# Patient Record
Sex: FEMALE | Race: BLACK OR AFRICAN AMERICAN | NOT HISPANIC OR LATINO | Employment: FULL TIME | URBAN - METROPOLITAN AREA
[De-identification: names, ages, dates, MRNs, and addresses within clinical notes are randomized per-mention and may not be internally consistent; named-entity substitution may affect disease eponyms.]

---

## 2018-06-18 LAB — ABORH_EXT (HISTORICAL CONVERSION): NORMAL

## 2018-06-25 LAB
ANTIBODY_EXT (HISTORICAL CONVERSION): NORMAL
HBSAG_EXT (HISTORICAL CONVERSION): NORMAL
HGB_EXT (HISTORICAL CONVERSION): 12.4
HIV_EXT: NORMAL
PLT_EXT - HISTORICAL: 166
RPR - HISTORICAL: NORMAL

## 2018-09-15 ENCOUNTER — HOSPITAL ENCOUNTER (OUTPATIENT)
Dept: OBGYN | Facility: HOSPITAL | Age: 33
Discharge: HOME | End: 2018-09-15
Attending: FAMILY MEDICINE | Admitting: FAMILY MEDICINE

## 2018-09-15 LAB
ALBUMIN UR-MCNC: NEGATIVE MG/DL
APPEARANCE UR: ABNORMAL
BACTERIA #/AREA URNS HPF: ABNORMAL HPF
BILIRUB UR QL STRIP: NEGATIVE
COLOR UR AUTO: YELLOW
ERYTHROCYTE [DISTWIDTH] IN BLOOD BY AUTOMATED COUNT: 13.7 % (ref 11–14.5)
GLUCOSE UR STRIP-MCNC: NEGATIVE MG/DL
HCT VFR BLD AUTO: 31.5 % (ref 35–47)
HGB BLD-MCNC: 10.9 G/DL (ref 12–16)
HGB UR QL STRIP: NEGATIVE
KETONES UR STRIP-MCNC: ABNORMAL MG/DL
LEUKOCYTE ESTERASE UR QL STRIP: NEGATIVE
MCH RBC QN AUTO: 31.9 PG (ref 27–34)
MCHC RBC AUTO-ENTMCNC: 34.6 G/DL (ref 32–36)
MCV RBC AUTO: 92 FL (ref 80–100)
MUCOUS THREADS #/AREA URNS LPF: ABNORMAL LPF
NITRATE UR QL: NEGATIVE
PH UR STRIP: 6 [PH] (ref 4.5–8)
PLATELET # BLD AUTO: 128 THOU/UL (ref 140–440)
PMV BLD AUTO: 11.9 FL (ref 8.5–12.5)
RBC # BLD AUTO: 3.42 MILL/UL (ref 3.8–5.4)
RBC #/AREA URNS AUTO: ABNORMAL HPF
SP GR UR STRIP: 1.02 (ref 1–1.03)
SQUAMOUS #/AREA URNS AUTO: ABNORMAL LPF
UROBILINOGEN UR STRIP-ACNC: ABNORMAL
WBC #/AREA URNS AUTO: ABNORMAL HPF
WBC: 11.4 THOU/UL (ref 4–11)

## 2018-09-15 ASSESSMENT — MIFFLIN-ST. JEOR: SCORE: 1447.51

## 2018-09-16 ENCOUNTER — COMMUNICATION - HEALTHEAST (OUTPATIENT)
Dept: OBGYN | Facility: HOSPITAL | Age: 33
End: 2018-09-16

## 2018-10-12 ENCOUNTER — ALLIED HEALTH/NURSE VISIT (OUTPATIENT)
Dept: NURSING | Facility: CLINIC | Age: 33
End: 2018-10-12

## 2018-10-12 VITALS — SYSTOLIC BLOOD PRESSURE: 112 MMHG | DIASTOLIC BLOOD PRESSURE: 72 MMHG | OXYGEN SATURATION: 98 % | HEART RATE: 104 BPM

## 2018-10-12 DIAGNOSIS — R42 DIZZINESS: Primary | ICD-10-CM

## 2018-10-12 PROCEDURE — 99207 ZZC NO CHARGE NURSE ONLY: CPT

## 2018-10-12 NOTE — MR AVS SNAPSHOT
After Visit Summary   10/12/2018    Cecil Anaya    MRN: [id number]           Patient Information     Date Of Birth          1985        Visit Information        Provider Department      10/12/2018 9:00 AM HP RN/TRIAGE NURSE ONLY [facility]        Today's Diagnoses     Dizziness    -  1       Follow-ups after your visit        Who to contact     If you have questions or need follow up information about today's clinic visit or your schedule please contact [facility] directly at [phone].  Normal or non-critical lab and imaging results will be communicated to you by MyChart, letter or phone within 4 business days after the clinic has received the results. If you do not hear from us within 7 days, please contact the clinic through MyChart or phone. If you have a critical or abnormal lab result, we will notify you by phone as soon as possible.  Submit refill requests through [employer] or call your pharmacy and they will forward the refill request to us. Please allow 3 business days for your refill to be completed.          Additional Information About Your Visit        Care EveryWhere ID     This is your Care EveryWhere ID. This could be used by other organizations to access your [place] medical records  [id number]        Your Vitals Were     Pulse Pulse Oximetry                104 98%           Blood Pressure from Last 3 Encounters:   10/12/18 112/72    Weight from Last 3 Encounters:   No data found for Wt              Today, you had the following     No orders found for display       Primary Care Provider    Provider Not In System                Equal Access to Services     VAZQUEZ OLIVA : Hadii hood bedoya Sooli, waaxda luqadaha, qaybta kaalmada brendan manriquez. So [facility] [phone].    ATENCIÓN: Si habla español, tiene a cuadra disposición servicios gratuitos de asistencia lingüística. Llame al [phone].    We  comply with applicable federal civil rights laws and [place] laws. We do not discriminate on the basis of race, color, national origin, age, disability, sex, sexual orientation, or gender identity.            Thank you!     Thank you for choosing [facility]  for your care. Our goal is always to provide you with excellent care. Hearing back from our patients is one way we can continue to improve our services. Please take a few minutes to complete the written survey that you may receive in the mail after your visit with us. Thank you!             Your Updated Medication List - Protect others around you: Learn how to safely use, store and throw away your medicines at www.disposemymeds.org.      Notice  As of 10/12/2018 11:20 AM    You have not been prescribed any medications.

## 2018-10-12 NOTE — NURSING NOTE
Triaged this walkin patient who states she had a dizzy lightheaded feeling while at work today and felt like she was going to faint and felt weak.  Work staff took her BP and it was 87/50.  She is driven here by her work supervisor. It was close to her work.  No records on this patient.    She is going to [facility] in [facility].[phone].  States she was just seen there on Tuesday and everything was ok.    Nurse did an assessment.  She has a better BP reading now. States she had breakfast and is drinking water.  Nurse called the triage nurse at [facility] and they have directed her to go to the [facility] and the supervisor will drive her there now.    She leaves ambulatory.  No diaphoresis. States she feels a little better.    She agrees with the plan.  Qiana Patel RN

## 2020-01-07 ENCOUNTER — HOSPITAL ENCOUNTER (OUTPATIENT)
Dept: OBGYN | Facility: HOSPITAL | Age: 35
Discharge: HOME | End: 2020-01-07
Attending: FAMILY MEDICINE | Admitting: FAMILY MEDICINE

## 2020-01-07 DIAGNOSIS — N76.0 BV (BACTERIAL VAGINOSIS): ICD-10-CM

## 2020-01-07 DIAGNOSIS — O47.9 BRAXTON HICKS CONTRACTIONS: ICD-10-CM

## 2020-01-07 DIAGNOSIS — B96.89 BV (BACTERIAL VAGINOSIS): ICD-10-CM

## 2020-01-07 LAB
ALBUMIN UR-MCNC: ABNORMAL MG/DL
AMORPH CRY #/AREA URNS HPF: ABNORMAL /[HPF]
APPEARANCE UR: ABNORMAL
BACTERIA #/AREA URNS HPF: ABNORMAL HPF
BILIRUB UR QL STRIP: NEGATIVE
CLUE CELLS: ABNORMAL
COLOR UR AUTO: YELLOW
GLUCOSE UR STRIP-MCNC: NEGATIVE MG/DL
HGB UR QL STRIP: NEGATIVE
HYALINE CASTS #/AREA URNS LPF: ABNORMAL LPF
KETONES UR STRIP-MCNC: NEGATIVE MG/DL
LEUKOCYTE ESTERASE UR QL STRIP: NEGATIVE
MUCOUS THREADS #/AREA URNS LPF: ABNORMAL LPF
NITRATE UR QL: NEGATIVE
PH UR STRIP: 7.5 [PH] (ref 4.5–8)
RBC #/AREA URNS AUTO: ABNORMAL HPF
SP GR UR STRIP: 1.02 (ref 1–1.03)
SQUAMOUS #/AREA URNS AUTO: ABNORMAL LPF
TRICHOMONAS, WET PREP: ABNORMAL
UROBILINOGEN UR STRIP-ACNC: ABNORMAL
WBC #/AREA URNS AUTO: ABNORMAL HPF
YEAST, WET PREP: ABNORMAL

## 2020-01-07 RX ORDER — HYDROXYZINE PAMOATE 50 MG/1
50 CAPSULE ORAL 3 TIMES DAILY PRN
Qty: 30 CAPSULE | Refills: 0 | Status: SHIPPED | OUTPATIENT
Start: 2020-01-07

## 2020-01-07 ASSESSMENT — MIFFLIN-ST. JEOR: SCORE: 1456.58

## 2020-01-08 ENCOUNTER — TELEPHONE (OUTPATIENT)
Dept: FAMILY MEDICINE | Facility: CLINIC | Age: 35
End: 2020-01-08

## 2020-01-08 NOTE — TELEPHONE ENCOUNTER
OB intake completed via phone with patient. Cecil is a 35 yo, , female,  with history of 2 full term deliveries, 2 premature deliveries (1- therapeutic, induced, stillbirth, vaginal delivery at 34-35 weeks gestation, 2- active labor and vaginal delivery at 35 weeks gestation).  One elective  in 2017 at 10 weeks gestation. Larry WELCH

## 2020-01-08 NOTE — TELEPHONE ENCOUNTER
----- Message from Yahir Hutchinson MD sent at 2020  8:46 PM CST -----  Pito Huang and Annetta,    I met this patient tonight in OB triage for rule out labor which we did (no labor). She currently follows with [facility] midwives but plans to deliver at [facility] and wanted to transfer care to our clinic if possible.    The best phone number to reach her is [phone] and she would like our clinic to reach out to her.    [facility]. Uncomplicated prenatal course thus far.    Just let me know if there are any questions, thanks!    ~Cam

## 2020-01-23 ENCOUNTER — HOSPITAL ENCOUNTER (OUTPATIENT)
Dept: OBGYN | Facility: HOSPITAL | Age: 35
Discharge: HOME | End: 2020-01-23
Attending: FAMILY MEDICINE | Admitting: FAMILY MEDICINE

## 2020-01-23 LAB
ALBUMIN UR-MCNC: ABNORMAL MG/DL
APPEARANCE UR: ABNORMAL
BACTERIA #/AREA URNS HPF: ABNORMAL HPF
BASOPHILS # BLD AUTO: 0 THOU/UL (ref 0–0.2)
BASOPHILS NFR BLD AUTO: 0 % (ref 0–2)
BILIRUB UR QL STRIP: NEGATIVE
COLOR UR AUTO: YELLOW
EOSINOPHIL # BLD AUTO: 0.1 THOU/UL (ref 0–0.4)
EOSINOPHIL NFR BLD AUTO: 1 % (ref 0–6)
ERYTHROCYTE [DISTWIDTH] IN BLOOD BY AUTOMATED COUNT: 15.5 % (ref 11–14.5)
GLUCOSE UR STRIP-MCNC: NEGATIVE MG/DL
HCT VFR BLD AUTO: 31.5 % (ref 35–47)
HGB BLD-MCNC: 10.4 G/DL (ref 12–16)
HGB UR QL STRIP: NEGATIVE
KETONES UR STRIP-MCNC: ABNORMAL MG/DL
LEUKOCYTE ESTERASE UR QL STRIP: ABNORMAL
LYMPHOCYTES # BLD AUTO: 2 THOU/UL (ref 0.8–4.4)
LYMPHOCYTES NFR BLD AUTO: 25 % (ref 20–40)
MCH RBC QN AUTO: 30 PG (ref 27–34)
MCHC RBC AUTO-ENTMCNC: 33 G/DL (ref 32–36)
MCV RBC AUTO: 91 FL (ref 80–100)
MONOCYTES # BLD AUTO: 0.6 THOU/UL (ref 0–0.9)
MONOCYTES NFR BLD AUTO: 7 % (ref 2–10)
MUCOUS THREADS #/AREA URNS LPF: ABNORMAL LPF
NEUTROPHILS # BLD AUTO: 5.3 THOU/UL (ref 2–7.7)
NEUTROPHILS NFR BLD AUTO: 66 % (ref 50–70)
NITRATE UR QL: NEGATIVE
PH UR STRIP: 6.5 [PH] (ref 4.5–8)
PLATELET # BLD AUTO: 103 THOU/UL (ref 140–440)
PMV BLD AUTO: 12.4 FL (ref 8.5–12.5)
RBC # BLD AUTO: 3.47 MILL/UL (ref 3.8–5.4)
RBC #/AREA URNS AUTO: ABNORMAL HPF
SP GR UR STRIP: 1.01 (ref 1–1.03)
SQUAMOUS #/AREA URNS AUTO: ABNORMAL LPF
UROBILINOGEN UR STRIP-ACNC: ABNORMAL
WBC #/AREA URNS AUTO: ABNORMAL HPF
WBC: 8.1 THOU/UL (ref 4–11)

## 2020-01-23 ASSESSMENT — MIFFLIN-ST. JEOR: SCORE: 1479.26

## 2020-01-24 ENCOUNTER — DOCUMENTATION ONLY (OUTPATIENT)
Dept: FAMILY MEDICINE | Facility: CLINIC | Age: 35
End: 2020-01-24

## 2020-01-24 NOTE — PROGRESS NOTES
HOUSE OFFICER NOTE    While I was on-call yesterday, late last night, I saw Cecil Anaya,  1985 in L&D who presented for the evaluation of lower abdominal pain.    Background:   Cecil Anaya 34 year old female,  at 32w4d (as of 2020) based on early dating ultrasound done on 2019 with ALYSA 03/15/2020. Dr. Hutchinson had also seen her in L&D on 2020, where she expressed interest to switch her prenatal care from [facility] to [place]. I also read Annetta Mercado's, RN OB intake note of 2020.    Assessment/Plan:  eCcil was found NOT to be in labor yesterday. Fetal heart sounds and tracings were reassuring. CBC and UA were unremarkable, so unlikely an infectious process causing the abdominal pain. Most likely explanation of her symptoms was ongoing stretching of the round ligament as the pregnancy progresses. I discussed the findings with the patient and she verbalized understanding. (Please refer to my [facility] Epic note for details of the encounter - MRN [id number])    -Patient is still very interested in establishing care with us.   -I  routed a message to Annetta Mercado RN and [facility] team so they can reach out to the patient to follow-up with me for MATT.    Sendy Sawyer MD, MPH (PGY 2)  [facility] Family Medicine Resident  Pager: [phone]

## 2020-01-25 LAB — BACTERIA SPEC CULT: NORMAL

## 2020-08-31 ENCOUNTER — AMBULATORY - HEALTHEAST (OUTPATIENT)
Dept: OTHER | Facility: CLINIC | Age: 35
End: 2020-08-31

## 2020-08-31 ENCOUNTER — DOCUMENTATION ONLY (OUTPATIENT)
Dept: OTHER | Facility: CLINIC | Age: 35
End: 2020-08-31

## 2021-06-02 VITALS — HEIGHT: 60 IN | WEIGHT: 181 LBS | BODY MASS INDEX: 35.53 KG/M2

## 2021-06-04 VITALS — WEIGHT: 183 LBS | BODY MASS INDEX: 35.93 KG/M2 | HEIGHT: 60 IN

## 2021-06-04 VITALS — HEIGHT: 60 IN | BODY MASS INDEX: 36.91 KG/M2 | WEIGHT: 188 LBS

## 2021-06-05 NOTE — PROGRESS NOTES
Category 1 tracing and no contractions noted on EFM. MD reviewed test results with patient and pt to DC home with prescriptions as ordered. Dc'd home after discharge instructions given and explained. Pt verbalized understanding.

## 2021-06-05 NOTE — PROGRESS NOTES
2015: Pt is G6,  at 32 4/7 wks, presents with complaints of abdominal pain since 3pm that has not gone away. States she is not sure if there are contractions. Denies ROM or bleeding and states good fetal movements. EFM/TOCO applied. UA obtained and sent.   2130: Resident MD at bedside. Blood drawn.   2230: Labs WNL. Resident again at bedside. Plan for return appt in clinic. Pt declining Tylenol or any other medications for pain. Pt has belly pregnancy band to wear at home. OK to HI home.

## 2021-06-05 NOTE — PROGRESS NOTES
"Patient arrives to the unit with complaints of abdominal pains and vaginal pressure that is intense. She denies LOF and bleeding. She denies having any vaginal discharge. She reports having all vaginal births, and one  delivery at 35weeks with her last child. During initial assessment, RN noticed that the patient appeared significantly uncomfortable, she reports that \"the baby is pushing through my vagina.\" RN does SVE and she is closed, thick, and high. Will plan to place OB arrival orders and do a wetprep and UA. Residents paged.   "

## 2021-06-05 NOTE — PROGRESS NOTES
[facility] FAMILY MEDICINE RESIDENCY PROGRESS NOTE - OB TRIAGE NOTE        Chief Complaint: Lower abdominal pain   OBSTETRICAL / DATING HISTORY:  Estimated Date of Delivery: Estimated Date of Delivery: 3/15/20  Gestational Age: 32w4d    PRENATAL HISTORY:  Obstetrician: Previously was seen at [facility]  - Clare RAE CNM; switched care to [facility] on 2020  Number of visits: > [facility] visits [facility]  Glucose test: Unknown  GBS: Known    OB History    Para Term  AB Living   6 4 2 2 1 3   SAB TAB Ectopic Multiple Live Births   1 0 0 0 3      # Outcome Date GA Lbr Osman/2nd Weight Sex Delivery Anes PTL Lv   6 Current            5  18 35w2d       ESPERANZA   4 SAB 17     SAB      3  11 33w5d       FD   2 Term 08 38w0d   M CS-LVertical   ESPERANZA      Birth Comments: emergency CS due to false positive HIV test. retest was negative   1 Term 06 38w0d   M Vag-Spont   ESPERANZA       HPI:      SUBJECTIVE  Cecil Anaya is a 34 y.o.  (1st at 37 weeks living; 2nd at 37 weeks living; 3rd still birth at 8.5 months; 4th at 35 weeks living; 5th ) at 32w4d of gestation based on early  dating ultrasound done on 2019 with ALYSA 03/15/2020who has presented to maternity care for further evaluation of lower abdominal pain.      Patient reports she was in her usual state of health until today around 1500 hrs while at work.  She started to experience lower abdominal pain, sudden onset, with unknown precipitating factors, aggravated by movement, unknown relieving factors, no radiation, intermittent in nature, 8/10, sharp in nature.  Reports normal urinary and bowel habits.  Patient denies labor-like contractions, that increase in intensity or frequency.  No recent febrile illness or contact with sick individuals. Patient reports no vaginal bleeding, no  contractions/severe cramping, no leakage of fluid. Fetal movement are present. No nausea/vomiting. No heartburn. No vaginal discharge. No dysuria. No headache, vision changes, lower extremity swelling, upper abdominal pain.       Review of Systems  Negative unless mentioned above     PAST SURGICAL HISTORY: Reviewed and none    MEDICATIONS:  Prenatal vitamins    SOCIAL HISTORY:   -Smoking: None  -Alcohol: None  -Illicit drug use: None    OBJECTIVE: PHYSICAL EXAMINATION     /63   Pulse 79   Temp 97.9  F (36.6  C) (Oral)   Resp 16   Ht 5' (1.524 m)   Wt 188 lb (85.3 kg)   BMI 36.72 kg/m     GENERAL:  Pleasant pregnant female, alert, well groomed.  SKIN:  Warm and dry, without lesions or rashes   EYES:  PERRLA, EOM intact   MOUTH:  Buccal mucosa pink, moist without lesions.    NECK:  Thyroid without enlargement and nodules.  No cervical lymphadenopathy.   LUNGS:  Clear to auscultation.   HEART:  RRR without murmur.   ABDOMEN: Soft without masses , tenderness or organomegaly.  No CVA tenderness. Fundus palpable above the umbilicus.  and 155  MUSCULOSKELETAL:  Full range of motion.   EXTREMITIES:  No edema. No significant varicosities.       OBGyn Exam  Fetal assessment:  -Heart Rate baseline classification: Baseline Range/Rate: 135  -Variability: Variability: Moderate Variability 6-25bpm    Uterine activity:  -Mode: Monitor Mode: External  -Contraction frequency: Contraction Frequency (min): None  -Resting Tone Palpated: Resting Tone Palpated: Soft    FETAL HEART MONITORING: Category 1    LAB RESULTS: Prenatal labs:   Hemoglobin   Date Value Ref Range Status   2020 10.4 (L) 12.0 - 16.0 g/dL Final       ASSESSMENT/PLAN:    Cecil Anaya is a 34 y.o.  at 32w4d of gestation based on early  dating ultrasound done on 2019 with ALYSA 03/15/2020, is NOT in labor based on monitoring and tracings.  Fetal heart sounds and tracings are reassuring.  CBC and UA are unremarkable, so unlikely an  "infectious process causing the abdominal pain.  Most likely explanation of the lower abdominal pain is ongoing stretching of the round ligament as the pregnancy progresses.    -Discussed this findings with the patient, and she verbalized understanding  -Offered acetaminophen, but patient declined stating \" I do not take pain medication when pregnant\"  -Anticipatory guidance was given on how to manage her abdominal discomfort conservatively.  Patient verbalized understanding  -I will send a message to our team at [facility] to follow-up with the patient to set up an appointment hopefully for tomorrow or next week for MATT  -Continue prenatal vitamins  -Encourage  the patient to report back to emergency department in the event symptoms persist or worsen  -Otherwise patient is safe to go home      Precepted/discussed patient with Dr. Samuel Sawyer MD, MPH (PGY 2)  [facility] Family Medicine Resident  Pager: [phone]  1/23/2020  9:38 PM      "

## 2021-06-05 NOTE — PROGRESS NOTES
OBSTETRICS TRIAGE ASSESSMENT NOTE  Cecil Anaya is a 34 y.o.  at 30w2d gestation based on dating ultrasound who has presented to maternity care for further evaluation of contractions and vaginal pressure. No bleeding, leakage of fluids. Baby is moving normally.     Patient states that she started to develop painful contractions 3 days ago.  These have been approximately every 5 to 10 minutes, have not changed in frequency or intensity.  Patient also has had vaginal pressure for the past 2 days which has progressed.  Patient was concerned that she was in  labor.  Her last baby was delivered at approximately 35 weeks.    PRENATAL CARE  Seen by Clare GARCIA) [facility].    OB History        6    Para   4    Term   2       2    AB   1    Living   3       SAB   1    TAB   0    Ectopic   0    Multiple   0    Live Births   3             Briefly:  Pregnancy #1:   Pregnancy #2: Emergent  for what she states was a false positive HIV as subsequent testing was negative, most recent test in summer 2018 on mom was negative  Pregnancy #3: IUFD at 8.5 months  Pregnancy #4: Elective   Pregnancy #5:  delivery at approximately 35 weeks after which she developed pulmonary edema.  She was referred to cardiology and [facility].  Echo was normal, most recent 2019 showed an EF of 55% and no other abnormalities.  Cardiology felt that her pulmonary edema was most likely from prolonged induction with Pitocin.    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    I reviewed most recent OB ultrasound from  which had no abnormalities.    PAST MEDICAL HISTORY  Post-partum pulmonary edema    PAST SURGICAL HISTORY   Past Surgical History:   Procedure Laterality Date      SECTION, LOW TRANSVERSE       MEDICATIONS    Current Facility-Administered Medications:       hydrOXYzine pamoate capsule 50 mg (VISTARIL), 50 mg, Oral, Once, Yahir Hutchinson MD     lactated Ringers, 0-500 mL/hr, Intravenous, Continuous, Derick Brush MD     metroNIDAZOLE tablet 500 mg (FLAGYL), 500 mg, Oral, Once, Yahir Hutchinson MD    SOCIAL HISTORY:   Social History     Socioeconomic History     Marital status: Single     Spouse name: Not on file     Number of children: Not on file     Years of education: Not on file     Highest education level: Not on file   Occupational History     Not on file   Social Needs     Financial resource strain: Not on file     Food insecurity:     Worry: Not on file     Inability: Not on file     Transportation needs:     Medical: Not on file     Non-medical: Not on file   Tobacco Use     Smoking status: Not on file   Substance and Sexual Activity     Alcohol use: Not on file     Drug use: Not on file     Sexual activity: Not on file   Lifestyle     Physical activity:     Days per week: Not on file     Minutes per session: Not on file     Stress: Not on file   Relationships     Social connections:     Talks on phone: Not on file     Gets together: Not on file     Attends Sikh service: Not on file     Active member of club or organization: Not on file     Attends meetings of clubs or organizations: Not on file     Relationship status: Not on file     Intimate partner violence:     Fear of current or ex partner: Not on file     Emotionally abused: Not on file     Physically abused: Not on file     Forced sexual activity: Not on file   Other Topics Concern     Not on file   Social History Narrative     Not on file     PHYSICAL EXAMINATION   /56   Pulse 88   Temp 98.2  F (36.8  C) (Oral)   Resp 18   Ht 5' (1.524 m)   Wt 183 lb (83 kg)   BMI 35.74 kg/m    Gen: appears comfortable in no acute distress  CV: regular rate and rhythm without murmur  Lungs: clear to ausculation, good air movement throughout  Abdomen: Gravid, non-tender  Cervix:  Dilatation (cm): 0cm/Effacement (%): 0%/   Extremities: no lower extremity edema    FETAL HEART MONITORING   Category 1 strip    CONTRACTIONS  Undetectable on strip    LAB RESULTS  Personally reviewed.  Recent Results (from the past 24 hour(s))   Collect and send wet prep vaginal specimen as indicated by prenatal history and/or patient complaints    Collection Time: 20  6:49 PM   Result Value Ref Range    Yeast Result No yeast seen No yeast seen    Trichomonas No Trichomonas seen No Trichomonas seen    Clue Cells, Wet Prep Clue cells seen (!) No Clue cells seen   Urinalysis-UC if Indicated    Collection Time: 20  7:24 PM   Result Value Ref Range    Color, UA Yellow Colorless, Yellow, Straw, Light Yellow    Clarity, UA Cloudy (!) Clear    Glucose, UA Negative Negative    Bilirubin, UA Negative Negative    Ketones, UA Negative Negative, 60 mg/dL    Specific Gravity, UA 1.017 1.001 - 1.030    Blood, UA Negative Negative    pH, UA 7.5 4.5 - 8.0    Protein, UA Trace (!) Negative mg/dL    Urobilinogen, UA <2.0 E.U./dL <2.0 E.U./dL, 2.0 E.U./dL    Nitrite, UA Negative Negative    Leukocytes, UA Negative Negative    Bacteria, UA None Seen None Seen hpf    RBC, UA 0-2 None Seen, 0-2 hpf    WBC, UA 0-5 None Seen, 0-5 hpf    Squam Epithel, UA 10-25 (!) None Seen, 0-5 lpf    Amorphous, UA Few (!) None Seen    Mucus, UA Few (!) None Seen lpf    Hyaline Casts, UA 0-5 0-5, None Seen lpf     ASSESSMENT/PLAN:   34 y.o.  at 30w2d gestation presenting to labor & delivery for rule out labor.  Given her closed, thick, high cervix and lack of contractility on the monitor I provided reassurance that patient was not in labor.  Contraction sensation is most likely Josh Farr.  Offered Vistaril to help with this.  UA not indicative of infection, wet prep positive for BV.  Discussed treatment course for this.  Will administer 1 dose tonight and provided with paper prescription for finishing her course as her pharmacy is  now closed this evening.    Patient states that she would like to deliver at [facility] as she lives in the area.  I explained that her current OB group does not deliver at this hospital and if she wanted to transfer her care to our clinic, we could explore that option.  She would like to proceed.  She would like us to reach out to her, I obtained her phone number which is [phone].    All questions answered, patient was discharged home in stable condition.    Yahir Hutchinson MD  [facility] Family Medicine Residency Program, PGY-3  Pager # [phone]    Precepted patient with Dr. Tana Gonzalez.

## 2021-06-16 PROBLEM — O47.9 BRAXTON HICKS CONTRACTIONS: Status: ACTIVE | Noted: 2020-01-07

## 2021-06-20 NOTE — PROGRESS NOTES
Gilbert on unit and I inform her Cecil presents with RLQ pain, tender upon palpation with guarding.   Abdomen otherwise soft and non-tender.  Writer informs Gilbert of her obstetrical history including previous , third trimester IUFD, no bleeding, no LOF, stable vital signs.  Gilbert will review care everywhere and see the patient.

## 2021-06-20 NOTE — PROGRESS NOTES
Cecil home stable and undelivered with discharge instructions.  Cecil verbalizes understanding of all discharge instructions.

## 2021-06-20 NOTE — PROGRESS NOTES
OB Triage Note    Cecil Anaya is a 33 y.o.  at 19w5d of gestation based on LMP, consistent with dating US at 6w who has presented to maternity care for further evaluation of RLQ pain.  This started this morning when she woke up at 0800 and has persisted all day. Pain is continuous with nothing that makes it better, worse with movement. She denies dysuria, frequency, urgency, increased vaginal discharge. She has been having fetal movement, however notes she has not felt movement since yesterday evening though is only 19w. No issues with this pregnancy. Did see a history of ovarian cysts in the past and was having right adnexal tenderness in July which patient states is different than the pain she is currently feeling as this pain is much more severe. No ovarian cysts seen on dating US from .     PRENATAL CARE: Seen by Dr. Light at [facility].    PAST MEDICAL HISTORY:    PRIOR PREGNANCIES/COMPLICATIONS: First pregnancy with vaginal delivery, term infant, no complications. Second delivery was caesarean section, patient believes secondary to inaccurate positive HIV screen. 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 Fourth pregnancy elective .      PAST SURGICAL HISTORY: C-sections x 1    MEDICATIONS:  Zantac, albuterol and Prenatal vitamins    SOCIAL HISTORY:    Smoking: None   Alcohol: None   Illicit drug use: None    PHYSICAL EXAMINATION:   Temp:  [98.3  F (36.8  C)] 98.3  F (36.8  C)  Heart Rate:  [80] 80  Resp:  [18] 18  BP: (109)/(72) 109/72     General: alert, well-appearing, non-toxic, NAD  HEENT: NC/AT, PERRLA, normal conjunctivae and sclerae, clear nares, MMM, normal oropharynx, neck supple  CV: RRR, no murmurs, rubs or gallops, 2+ peripheral pulses  Respiratory: normal effort, lungs CTA bilaterally with good aeration throughout  Abdomen: gravid, soft, significant tenderness to palpation  in right lower quadrant with guarding.   Extremities: warm, no edema    FETAL HEART MONITORING: Doppler HR 140s     LAB RESULTS: Reviewed   Recent Results (from the past 24 hour(s))   Urinalysis-UC if Indicated    Collection Time: 09/15/18  8:03 PM   Result Value Ref Range    Color, UA Yellow Colorless, Yellow, Straw, Light Yellow    Clarity, UA Cloudy (!) Clear    Glucose, UA Negative Negative    Bilirubin, UA Negative Negative    Ketones, UA 40 mg/dL (!) Negative, 60 mg/dL    Specific Gravity, UA 1.017 1.001 - 1.030    Blood, UA Negative Negative    pH, UA 6.0 4.5 - 8.0    Protein, UA Negative Negative mg/dL    Urobilinogen, UA <2.0 E.U./dL <2.0 E.U./dL, 2.0 E.U./dL    Nitrite, UA Negative Negative    Leukocytes, UA Negative Negative    Bacteria, UA None Seen None Seen hpf    RBC, UA 0-2 None Seen, 0-2 hpf    WBC, UA 0-5 None Seen, 0-5 hpf    Squam Epithel, UA  (!) None Seen, 0-5 lpf    Mucus, UA Many (!) None Seen lpf   HM2(CBC W/O DIFF)    Collection Time: 09/15/18  8:50 PM   Result Value Ref Range    WBC 11.4 (H) 4.0 - 11.0 thou/uL    RBC 3.42 (L) 3.80 - 5.40 mill/uL    Hemoglobin 10.9 (L) 12.0 - 16.0 g/dL    Hematocrit 31.5 (L) 35.0 - 47.0 %    MCV 92 80 - 100 fL    MCH 31.9 27.0 - 34.0 pg    MCHC 34.6 32.0 - 36.0 g/dL    RDW 13.7 11.0 - 14.5 %    Platelets 128 (L) 140 - 440 thou/uL    MPV 11.9 8.5 - 12.5 fL     ULTRASOUND LIMITED OBSTETRICAL  9/15/2018 10:09 PM  INDICATION: Rlq tenderness in pregnancy  TECHNIQUE: Transabdominal and endovaginal ultrasound.  COMPARISON: None.     FINDINGS:  FETAL POSITION: Variable/breech   PLACENTA LOCATION: Anterior  DISTANCE FROM PLACENTA TO CERVIX: Not low lying  AMNIOTIC FLUID: Normal  FETAL HEART RATE: 150 bpm  CERVICAL LENGTH: 3.8 cm  Solitary echogenic intracardiac focus (EIF) was incidentally noted.  LEFT OVARY: 2.8 x 1.5 x 2.6 cm  RIGHT OVARY: 2.7 x 1.8 x 2.5 cm.     IMPRESSION:   CONCLUSION:  1.  Single intrauterine pregnancy with positive fetal motion.  2.   Unremarkable maternal ovaries.  3.  Isolated EIF incidentally noted. In normal pregnancies, this is usually a benign variant. Correlation with prior anatomy scans and maternal risk factors suggested.    [facility]  US APPENDIX  9/15/2018 10:08 PM  INDICATION: Rlq pain  TECHNIQUE: Real-time ultrasonographic evaluation of the right lower quadrant was performed using graded compression. Multiple static images were obtained.  COMPARISON: None     FINDINGS:   Visualization of appendix: Non-visualized  Measurements of appendix: Not applicable  Secondary signs (appendix): Not applicable  Secondary signs (regional): No echogenic fat or fluid collections with normal regional bowel.  Right lower quadrant pain: Absent  Free fluid: Absent  Lymph nodes: Absent     IMPRESSION:   CONCLUSION:  Non-visualized appendix.  Patient reported tenderness in the left lower quadrant over the anterior uterus where a rounded area of thickening in the uterine wall was noted, favored to represent a contraction.    ASSESSMENT/PLAN:   Right Lower Quadrant Pain   UA performed and negative for signs of infection. No RBC present so less likely kidney stone. Concern for appendicitis given right lower quadrant pain, however no nausea, vomiting, afebrile and all VSS which makes this less likely. Obtained US to check status of fetus and assess for appendicitis. CBC was obtained and normal WBC in pregnancy. Abdominal US unable to visualize appendix, however given above this seems unlikely at this time. Read states possible contraction, however patient denies cramping pain, there is tenderness to palpation and it would be unusual to be unilateral, therefore not consistent with clinical picture. OB US normal with incidental small echogenic focus seen in left ventricle.   - Follow up with provider in 2-3 days   - Return if develop nausea, vomiting, fevers, cramping, bleeding, loss of fluid  - Follow up with PCP regarding echogenic focus seen on      Sis Hunt DO ([id number])  [facility] [place] Resident  Pager: [phone]    Precepted patient with in-house OB Dr. Bruner

## 2024-03-06 ENCOUNTER — HOSPITAL ENCOUNTER (OUTPATIENT)
Dept: RADIOLOGY | Facility: CLINIC | Age: 39
Discharge: HOME | End: 2024-03-06
Attending: INTERNAL MEDICINE | Admitting: INTERNAL MEDICINE

## 2024-03-06 DIAGNOSIS — R76.11 POSITIVE TB TEST: ICD-10-CM

## 2024-03-06 PROCEDURE — 71045 X-RAY EXAM CHEST 1 VIEW: CPT
